# Patient Record
Sex: MALE | Race: ASIAN | NOT HISPANIC OR LATINO | URBAN - METROPOLITAN AREA
[De-identification: names, ages, dates, MRNs, and addresses within clinical notes are randomized per-mention and may not be internally consistent; named-entity substitution may affect disease eponyms.]

---

## 2022-03-25 ENCOUNTER — INPATIENT (INPATIENT)
Facility: HOSPITAL | Age: 67
LOS: 2 days | Discharge: HOME | DRG: 247 | End: 2022-03-28
Attending: INTERNAL MEDICINE | Admitting: HOSPITALIST
Payer: COMMERCIAL

## 2022-03-25 VITALS
TEMPERATURE: 98 F | HEIGHT: 65 IN | WEIGHT: 154.98 LBS | RESPIRATION RATE: 17 BRPM | OXYGEN SATURATION: 98 % | SYSTOLIC BLOOD PRESSURE: 160 MMHG | DIASTOLIC BLOOD PRESSURE: 67 MMHG | HEART RATE: 66 BPM

## 2022-03-25 LAB
APTT BLD: 106.6 SEC — HIGH (ref 27.5–35.5)
BASOPHILS # BLD AUTO: 0.02 K/UL — SIGNIFICANT CHANGE UP (ref 0–0.2)
BASOPHILS NFR BLD AUTO: 0.2 % — SIGNIFICANT CHANGE UP (ref 0–2)
EOSINOPHIL # BLD AUTO: 0.13 K/UL — SIGNIFICANT CHANGE UP (ref 0–0.5)
EOSINOPHIL NFR BLD AUTO: 1.3 % — SIGNIFICANT CHANGE UP (ref 0–6)
HCT VFR BLD CALC: 40.6 % — SIGNIFICANT CHANGE UP (ref 39–50)
HGB BLD-MCNC: 13.7 G/DL — SIGNIFICANT CHANGE UP (ref 13–17)
IMM GRANULOCYTES NFR BLD AUTO: 0.5 % — SIGNIFICANT CHANGE UP (ref 0–1.5)
INR BLD: 1 RATIO — SIGNIFICANT CHANGE UP (ref 0.88–1.16)
LYMPHOCYTES # BLD AUTO: 3.87 K/UL — HIGH (ref 1–3.3)
LYMPHOCYTES # BLD AUTO: 39.9 % — SIGNIFICANT CHANGE UP (ref 13–44)
MCHC RBC-ENTMCNC: 28.8 PG — SIGNIFICANT CHANGE UP (ref 27–34)
MCHC RBC-ENTMCNC: 33.7 GM/DL — SIGNIFICANT CHANGE UP (ref 32–36)
MCV RBC AUTO: 85.3 FL — SIGNIFICANT CHANGE UP (ref 80–100)
MONOCYTES # BLD AUTO: 0.81 K/UL — SIGNIFICANT CHANGE UP (ref 0–0.9)
MONOCYTES NFR BLD AUTO: 8.3 % — SIGNIFICANT CHANGE UP (ref 2–14)
NEUTROPHILS # BLD AUTO: 4.83 K/UL — SIGNIFICANT CHANGE UP (ref 1.8–7.4)
NEUTROPHILS NFR BLD AUTO: 49.8 % — SIGNIFICANT CHANGE UP (ref 43–77)
NRBC # BLD: 0 /100 WBCS — SIGNIFICANT CHANGE UP (ref 0–0)
PLATELET # BLD AUTO: 245 K/UL — SIGNIFICANT CHANGE UP (ref 150–400)
PROTHROM AB SERPL-ACNC: 11.6 SEC — SIGNIFICANT CHANGE UP (ref 10.5–13.4)
RBC # BLD: 4.76 M/UL — SIGNIFICANT CHANGE UP (ref 4.2–5.8)
RBC # FLD: 13.3 % — SIGNIFICANT CHANGE UP (ref 10.3–14.5)
WBC # BLD: 9.71 K/UL — SIGNIFICANT CHANGE UP (ref 3.8–10.5)
WBC # FLD AUTO: 9.71 K/UL — SIGNIFICANT CHANGE UP (ref 3.8–10.5)

## 2022-03-25 PROCEDURE — 93010 ELECTROCARDIOGRAM REPORT: CPT

## 2022-03-25 PROCEDURE — 71045 X-RAY EXAM CHEST 1 VIEW: CPT | Mod: 26

## 2022-03-25 PROCEDURE — 99285 EMERGENCY DEPT VISIT HI MDM: CPT

## 2022-03-25 RX ORDER — HEPARIN SODIUM 5000 [USP'U]/ML
INJECTION INTRAVENOUS; SUBCUTANEOUS
Qty: 25000 | Refills: 0 | Status: DISCONTINUED | OUTPATIENT
Start: 2022-03-25 | End: 2022-03-27

## 2022-03-25 RX ORDER — HEPARIN SODIUM 5000 [USP'U]/ML
INJECTION INTRAVENOUS; SUBCUTANEOUS
Qty: 25000 | Refills: 0 | Status: DISCONTINUED | OUTPATIENT
Start: 2022-03-25 | End: 2022-03-25

## 2022-03-25 RX ORDER — HEPARIN SODIUM 5000 [USP'U]/ML
4100 INJECTION INTRAVENOUS; SUBCUTANEOUS EVERY 6 HOURS
Refills: 0 | Status: DISCONTINUED | OUTPATIENT
Start: 2022-03-25 | End: 2022-03-25

## 2022-03-25 RX ORDER — HEPARIN SODIUM 5000 [USP'U]/ML
4100 INJECTION INTRAVENOUS; SUBCUTANEOUS ONCE
Refills: 0 | Status: DISCONTINUED | OUTPATIENT
Start: 2022-03-25 | End: 2022-03-27

## 2022-03-25 RX ADMIN — HEPARIN SODIUM 900 UNIT(S)/HR: 5000 INJECTION INTRAVENOUS; SUBCUTANEOUS at 23:31

## 2022-03-25 NOTE — ED PROVIDER NOTE - CLINICAL SUMMARY MEDICAL DECISION MAKING FREE TEXT BOX
65y/o M w/ h/o HTN, HLD, prediabetes BIBEMS for NSTEMI, 2wks exertional mid sternal chest pain, given 324mg ASA, Brilinta, heparin bolus. without active pain. EKG shows STD in inferior leads. Will re-check labs, cardiac enzymes, CXR and admit.

## 2022-03-25 NOTE — ED PROVIDER NOTE - OBJECTIVE STATEMENT
65y/o M w/ h/o HTN, HLD, prediabetes (HbA1c 6.5%) transfer from [facility] BIBEMS fro NSTEMI. Pt states he has had 2wks mid sternal chest pain, worse with exertion. Today was unable to walk 1 block so went to ED and had elevated troponin. Was given 324mg ASA, Brilinta, heparin bolus. Now in no active chest pain. Denies fevers, chills, nausea, vomiting, palpitations, cough, sob, abdominal pain, back pain, dysuria, numbness, tingling, recent surgeries, travel history, calf pain, FHx heart disease.

## 2022-03-25 NOTE — ED PROVIDER NOTE - ATTENDING CONTRIBUTION TO CARE
attending Camelia: 66yM h/o HTN, HLD, prediabetes (HbA1c 6.5%) transferred from OSH for NSTEMI. Pt with 2 weeks mid sternal chest pain, worse with exertion. Found to have elevated troponin, given 324mg ASA, Brilinta, heparin bolus. No active chest pain on arrival. Will obtain repeat ekg, place on tele, labs including trop, maintain on heparin gtt, cardiology eval in ED, admit

## 2022-03-25 NOTE — ED ADULT TRIAGE NOTE - TEMPERATURE IN CELSIUS (DEGREES C)
Erythromycin Pregnancy And Lactation Text: This medication is Pregnancy Category B and is considered safe during pregnancy. It is also excreted in breast milk. Topical Retinoid Pregnancy And Lactation Text: This medication is Pregnancy Category C. It is unknown if this medication is excreted in breast milk. Minocycline Pregnancy And Lactation Text: This medication is Pregnancy Category D and not consider safe during pregnancy. It is also excreted in breast milk. Dapsone Pregnancy And Lactation Text: This medication is Pregnancy Category C and is not considered safe during pregnancy or breast feeding. Isotretinoin Counseling: Patient should get monthly blood tests, not donate blood, not drive at night if vision affected, not share medication, and not undergo elective surgery for 6 months after tx completed. Side effects reviewed, pt to contact office should one occur. Azithromycin Counseling:  I discussed with the patient the risks of azithromycin including but not limited to GI upset, allergic reaction, drug rash, diarrhea, and yeast infections. Topical Clindamycin Pregnancy And Lactation Text: This medication is Pregnancy Category B and is considered safe during pregnancy. It is unknown if it is excreted in breast milk. Spironolactone Counseling: Patient advised regarding risks of diarrhea, abdominal pain, hyperkalemia, birth defects (for female patients), liver toxicity and renal toxicity. The patient may need blood work to monitor liver and kidney function and potassium levels while on therapy. The patient verbalized understanding of the proper use and possible adverse effects of spironolactone.  All of the patient's questions and concerns were addressed. Azithromycin Pregnancy And Lactation Text: This medication is considered safe during pregnancy and is also secreted in breast milk. Minocycline Counseling: Patient advised regarding possible photosensitivity and discoloration of the teeth, skin, lips, tongue and gums.  Patient instructed to avoid sunlight, if possible.  When exposed to sunlight, patients should wear protective clothing, sunglasses, and sunscreen.  The patient was instructed to call the office immediately if the following severe adverse effects occur:  hearing changes, easy bruising/bleeding, severe headache, or vision changes.  The patient verbalized understanding of the proper use and possible adverse effects of minocycline.  All of the patient's questions and concerns were addressed. Topical Sulfur Applications Counseling: Topical Sulfur Counseling: Patient counseled that this medication may cause skin irritation or allergic reactions.  In the event of skin irritation, the patient was advised to reduce the amount of the drug applied or use it less frequently.   The patient verbalized understanding of the proper use and possible adverse effects of topical sulfur application.  All of the patient's questions and concerns were addressed. Doxycycline Pregnancy And Lactation Text: This medication is Pregnancy Category D and not consider safe during pregnancy. It is also excreted in breast milk but is considered safe for shorter treatment courses. Tazorac Counseling:  Patient advised that medication is irritating and drying.  Patient may need to apply sparingly and wash off after an hour before eventually leaving it on overnight.  The patient verbalized understanding of the proper use and possible adverse effects of tazorac.  All of the patient's questions and concerns were addressed. Include Pregnancy/Lactation Warning?: No Bactrim Counseling:  I discussed with the patient the risks of sulfa antibiotics including but not limited to GI upset, allergic reaction, drug rash, diarrhea, dizziness, photosensitivity, and yeast infections.  Rarely, more serious reactions can occur including but not limited to aplastic anemia, agranulocytosis, methemoglobinemia, blood dyscrasias, liver or kidney failure, lung infiltrates or desquamative/blistering drug rashes. 36.9 Topical Sulfur Applications Pregnancy And Lactation Text: This medication is Pregnancy Category C and has an unknown safety profile during pregnancy. It is unknown if this topical medication is excreted in breast milk. Erythromycin Counseling:  I discussed with the patient the risks of erythromycin including but not limited to GI upset, allergic reaction, drug rash, diarrhea, increase in liver enzymes, and yeast infections. Topical Retinoid counseling:  Patient advised to apply a pea-sized amount only at bedtime and wait 30 minutes after washing their face before applying.  If too drying, patient may add a non-comedogenic moisturizer. The patient verbalized understanding of the proper use and possible adverse effects of retinoids.  All of the patient's questions and concerns were addressed. Topical Clindamycin Counseling: Patient counseled that this medication may cause skin irritation or allergic reactions.  In the event of skin irritation, the patient was advised to reduce the amount of the drug applied or use it less frequently.   The patient verbalized understanding of the proper use and possible adverse effects of clindamycin.  All of the patient's questions and concerns were addressed. Birth Control Pills Pregnancy And Lactation Text: This medication should be avoided if pregnant and for the first 30 days post-partum. Birth Control Pills Counseling: Birth Control Pill Counseling: I discussed with the patient the potential side effects of OCPs including but not limited to increased risk of stroke, heart attack, thrombophlebitis, deep venous thrombosis, hepatic adenomas, breast changes, GI upset, headaches, and depression.  The patient verbalized understanding of the proper use and possible adverse effects of OCPs. All of the patient's questions and concerns were addressed. Benzoyl Peroxide Pregnancy And Lactation Text: This medication is Pregnancy Category C. It is unknown if benzoyl peroxide is excreted in breast milk. Dapsone Counseling: I discussed with the patient the risks of dapsone including but not limited to hemolytic anemia, agranulocytosis, rashes, methemoglobinemia, kidney failure, peripheral neuropathy, headaches, GI upset, and liver toxicity.  Patients who start dapsone require monitoring including baseline LFTs and weekly CBCs for the first month, then every month thereafter.  The patient verbalized understanding of the proper use and possible adverse effects of dapsone.  All of the patient's questions and concerns were addressed. Tazorac Pregnancy And Lactation Text: This medication is not safe during pregnancy. It is unknown if this medication is excreted in breast milk. Detail Level: Zone High Dose Vitamin A Counseling: Side effects reviewed, pt to contact office should one occur. Spironolactone Pregnancy And Lactation Text: This medication can cause feminization of the male fetus and should be avoided during pregnancy. The active metabolite is also found in breast milk. Benzoyl Peroxide Counseling: Patient counseled that medicine may cause skin irritation and bleach clothing.  In the event of skin irritation, the patient was advised to reduce the amount of the drug applied or use it less frequently.   The patient verbalized understanding of the proper use and possible adverse effects of benzoyl peroxide.  All of the patient's questions and concerns were addressed. High Dose Vitamin A Pregnancy And Lactation Text: High dose vitamin A therapy is contraindicated during pregnancy and breast feeding. Tetracycline Counseling: Patient counseled regarding possible photosensitivity and increased risk for sunburn.  Patient instructed to avoid sunlight, if possible.  When exposed to sunlight, patients should wear protective clothing, sunglasses, and sunscreen.  The patient was instructed to call the office immediately if the following severe adverse effects occur:  hearing changes, easy bruising/bleeding, severe headache, or vision changes.  The patient verbalized understanding of the proper use and possible adverse effects of tetracycline.  All of the patient's questions and concerns were addressed. Patient understands to avoid pregnancy while on therapy due to potential birth defects. Isotretinoin Pregnancy And Lactation Text: This medication is Pregnancy Category X and is considered extremely dangerous during pregnancy. It is unknown if it is excreted in breast milk. Bactrim Pregnancy And Lactation Text: This medication is Pregnancy Category D and is known to cause fetal risk.  It is also excreted in breast milk. Doxycycline Counseling:  Patient counseled regarding possible photosensitivity and increased risk for sunburn.  Patient instructed to avoid sunlight, if possible.  When exposed to sunlight, patients should wear protective clothing, sunglasses, and sunscreen.  The patient was instructed to call the office immediately if the following severe adverse effects occur:  hearing changes, easy bruising/bleeding, severe headache, or vision changes.  The patient verbalized understanding of the proper use and possible adverse effects of doxycycline.  All of the patient's questions and concerns were addressed.

## 2022-03-25 NOTE — ED ADULT NURSE REASSESSMENT NOTE - NS ED NURSE REASSESS COMMENT FT1
Second IV placed for additional access. Heparin infusion initiated as per orders based off ACS Heparin nomogram. Second RN present to confirm correct medication administration. Patient currently safe and comfortable. Will continue with plan of care as ordered.

## 2022-03-26 DIAGNOSIS — I21.4 NON-ST ELEVATION (NSTEMI) MYOCARDIAL INFARCTION: ICD-10-CM

## 2022-03-26 LAB
ALBUMIN SERPL ELPH-MCNC: 4.8 G/DL — SIGNIFICANT CHANGE UP (ref 3.3–5)
ALP SERPL-CCNC: 86 U/L — SIGNIFICANT CHANGE UP (ref 40–120)
ALT FLD-CCNC: 17 U/L — SIGNIFICANT CHANGE UP (ref 10–45)
ANION GAP SERPL CALC-SCNC: 17 MMOL/L — SIGNIFICANT CHANGE UP (ref 5–17)
APTT BLD: 129 SEC — CRITICAL HIGH (ref 27.5–35.5)
APTT BLD: 41.4 SEC — HIGH (ref 27.5–35.5)
APTT BLD: 45.7 SEC — HIGH (ref 27.5–35.5)
AST SERPL-CCNC: 38 U/L — SIGNIFICANT CHANGE UP (ref 10–40)
BILIRUB SERPL-MCNC: 0.4 MG/DL — SIGNIFICANT CHANGE UP (ref 0.2–1.2)
BUN SERPL-MCNC: 11 MG/DL — SIGNIFICANT CHANGE UP (ref 7–23)
CALCIUM SERPL-MCNC: 10 MG/DL — SIGNIFICANT CHANGE UP (ref 8.4–10.5)
CHLORIDE SERPL-SCNC: 104 MMOL/L — SIGNIFICANT CHANGE UP (ref 96–108)
CK MB BLD-MCNC: 2.2 % — SIGNIFICANT CHANGE UP (ref 0–3.5)
CK MB CFR SERPL CALC: 2.9 NG/ML — SIGNIFICANT CHANGE UP (ref 0–6.7)
CK MB CFR SERPL CALC: 3.5 NG/ML — SIGNIFICANT CHANGE UP (ref 0–6.7)
CK SERPL-CCNC: 132 U/L — SIGNIFICANT CHANGE UP (ref 30–200)
CO2 SERPL-SCNC: 20 MMOL/L — LOW (ref 22–31)
CREAT SERPL-MCNC: 0.97 MG/DL — SIGNIFICANT CHANGE UP (ref 0.5–1.3)
EGFR: 87 ML/MIN/1.73M2 — SIGNIFICANT CHANGE UP
GLUCOSE SERPL-MCNC: 109 MG/DL — HIGH (ref 70–99)
HCT VFR BLD CALC: 39.2 % — SIGNIFICANT CHANGE UP (ref 39–50)
HGB BLD-MCNC: 13.1 G/DL — SIGNIFICANT CHANGE UP (ref 13–17)
MCHC RBC-ENTMCNC: 28.6 PG — SIGNIFICANT CHANGE UP (ref 27–34)
MCHC RBC-ENTMCNC: 33.4 GM/DL — SIGNIFICANT CHANGE UP (ref 32–36)
MCV RBC AUTO: 85.6 FL — SIGNIFICANT CHANGE UP (ref 80–100)
NRBC # BLD: 0 /100 WBCS — SIGNIFICANT CHANGE UP (ref 0–0)
PLATELET # BLD AUTO: 213 K/UL — SIGNIFICANT CHANGE UP (ref 150–400)
POTASSIUM SERPL-MCNC: 4.6 MMOL/L — SIGNIFICANT CHANGE UP (ref 3.5–5.3)
POTASSIUM SERPL-SCNC: 4.6 MMOL/L — SIGNIFICANT CHANGE UP (ref 3.5–5.3)
PROT SERPL-MCNC: 7.9 G/DL — SIGNIFICANT CHANGE UP (ref 6–8.3)
RBC # BLD: 4.58 M/UL — SIGNIFICANT CHANGE UP (ref 4.2–5.8)
RBC # FLD: 13.5 % — SIGNIFICANT CHANGE UP (ref 10.3–14.5)
SARS-COV-2 RNA SPEC QL NAA+PROBE: SIGNIFICANT CHANGE UP
SODIUM SERPL-SCNC: 141 MMOL/L — SIGNIFICANT CHANGE UP (ref 135–145)
TROPONIN T, HIGH SENSITIVITY RESULT: 31 NG/L — SIGNIFICANT CHANGE UP (ref 0–51)
TROPONIN T, HIGH SENSITIVITY RESULT: 34 NG/L — SIGNIFICANT CHANGE UP (ref 0–51)
TROPONIN T, HIGH SENSITIVITY RESULT: 45 NG/L — SIGNIFICANT CHANGE UP (ref 0–51)
TROPONIN T, HIGH SENSITIVITY RESULT: 50 NG/L — SIGNIFICANT CHANGE UP (ref 0–51)
WBC # BLD: 9.55 K/UL — SIGNIFICANT CHANGE UP (ref 3.8–10.5)
WBC # FLD AUTO: 9.55 K/UL — SIGNIFICANT CHANGE UP (ref 3.8–10.5)

## 2022-03-26 PROCEDURE — 99223 1ST HOSP IP/OBS HIGH 75: CPT | Mod: GC

## 2022-03-26 RX ORDER — LISINOPRIL 2.5 MG/1
1 TABLET ORAL
Qty: 0 | Refills: 0 | DISCHARGE

## 2022-03-26 RX ORDER — METOPROLOL TARTRATE 50 MG
25 TABLET ORAL
Refills: 0 | Status: DISCONTINUED | OUTPATIENT
Start: 2022-03-26 | End: 2022-03-27

## 2022-03-26 RX ORDER — ATORVASTATIN CALCIUM 80 MG/1
1 TABLET, FILM COATED ORAL
Qty: 0 | Refills: 0 | DISCHARGE

## 2022-03-26 RX ORDER — ASPIRIN/CALCIUM CARB/MAGNESIUM 324 MG
81 TABLET ORAL DAILY
Refills: 0 | Status: DISCONTINUED | OUTPATIENT
Start: 2022-03-26 | End: 2022-03-28

## 2022-03-26 RX ORDER — ATORVASTATIN CALCIUM 80 MG/1
40 TABLET, FILM COATED ORAL AT BEDTIME
Refills: 0 | Status: DISCONTINUED | OUTPATIENT
Start: 2022-03-26 | End: 2022-03-28

## 2022-03-26 RX ORDER — LISINOPRIL 2.5 MG/1
10 TABLET ORAL DAILY
Refills: 0 | Status: DISCONTINUED | OUTPATIENT
Start: 2022-03-26 | End: 2022-03-27

## 2022-03-26 RX ORDER — ASPIRIN/CALCIUM CARB/MAGNESIUM 324 MG
1 TABLET ORAL
Qty: 0 | Refills: 0 | DISCHARGE

## 2022-03-26 RX ADMIN — HEPARIN SODIUM 850 UNIT(S)/HR: 5000 INJECTION INTRAVENOUS; SUBCUTANEOUS at 14:40

## 2022-03-26 RX ADMIN — LISINOPRIL 10 MILLIGRAM(S): 2.5 TABLET ORAL at 14:47

## 2022-03-26 RX ADMIN — HEPARIN SODIUM 700 UNIT(S)/HR: 5000 INJECTION INTRAVENOUS; SUBCUTANEOUS at 08:02

## 2022-03-26 RX ADMIN — Medication 25 MILLIGRAM(S): at 18:13

## 2022-03-26 RX ADMIN — HEPARIN SODIUM 1000 UNIT(S)/HR: 5000 INJECTION INTRAVENOUS; SUBCUTANEOUS at 22:51

## 2022-03-26 RX ADMIN — HEPARIN SODIUM 0 UNIT(S)/HR: 5000 INJECTION INTRAVENOUS; SUBCUTANEOUS at 07:02

## 2022-03-26 RX ADMIN — ATORVASTATIN CALCIUM 40 MILLIGRAM(S): 80 TABLET, FILM COATED ORAL at 21:42

## 2022-03-26 RX ADMIN — Medication 81 MILLIGRAM(S): at 14:50

## 2022-03-26 RX ADMIN — HEPARIN SODIUM 700 UNIT(S)/HR: 5000 INJECTION INTRAVENOUS; SUBCUTANEOUS at 09:15

## 2022-03-26 NOTE — ED ADULT NURSE REASSESSMENT NOTE - NS ED NURSE REASSESS COMMENT FT1
repeat INR & CBC sent to lab as per MD order. Awaiting results for Heparin titration, system in downtime. ANM aware.

## 2022-03-26 NOTE — H&P ADULT - NSHPLABSRESULTS_GEN_ALL_CORE
LABS:                        13.1   9.55  )-----------( 213      ( 26 Mar 2022 06:01 )             39.2     03-25    141  |  104  |  11  ----------------------------<  109<H>  4.6   |  20<L>  |  0.97    Ca    10.0      25 Mar 2022 23:24    TPro  7.9  /  Alb  4.8  /  TBili  0.4  /  DBili  x   /  AST  38  /  ALT  17  /  AlkPhos  86  03-25    PT/INR - ( 25 Mar 2022 23:24 )   PT: 11.6 sec;   INR: 1.00 ratio         PTT - ( 26 Mar 2022 05:20 )  PTT:129.0 sec        RADIOLOGY & ADDITIONAL TESTS:

## 2022-03-26 NOTE — CONSULT NOTE ADULT - ATTENDING COMMENTS
66yoM PMH HTN, HLD, DM presenting as a transfer from [facility] for NSTEMI with typical chest pain and troponin production. For Cath.  -Patients symptoms are typical for unstable angina. CE here are not suggestive of MI. That said given symptoms and risk factors patient should be managed as ACS. Continue medications for ACS. Plan for cardiac cath. Currently pain free. If pain returns will do cath emergently. Discussed with patient's wife and Dr. Sifuentes.

## 2022-03-26 NOTE — H&P ADULT - ASSESSMENT
65 y/o male PMH HTN, HLD, prediabetes (HbA1c 6.5%) transfer from [facility] [place] fro NSTEMI. Pt states he has had 2 weeks of mid sternal chest pain, worse with exertion. Today was unable to walk 1 block so went to ED and had elevated troponin. Was given 324mg ASA, Brilinta, heparin bolus. Now in no active chest pain. Denies fevers, chills, nausea, vomiting, sob, abdominal pain, back pain, dysuria. FHx heart disease.    Plan: Admit to tele, follow trop. EKG is NSR, trop was elevated mildly at prior hospital.     Start IV Heparin, ASA, Lipitor, Lisinopril, add Lopressor low dose. Cath on Monday.   65 y/o male PMH HTN, HLD, prediabetes (HbA1c 6.5%) transfer from [facility] [place] fro NSTEMI. Pt states he has had 2 weeks of mid sternal chest pain, worse with exertion. Today was unable to walk 1 block so went to ED and had elevated troponin. Was given 324mg ASA, Brilinta, heparin bolus. Now in no active chest pain. Denies fevers, chills, nausea, vomiting, sob, abdominal pain, back pain, dysuria. FHx heart disease.      Plan: Admit to tele for possible NSTEMI. Follow trop. EKG is NSR, trop was elevated mildly at prior hospital, now normal x 2.      Start IV Heparin, ASA, Lipitor, Lisinopril, add Lopressor low dose. Cath on Monday.  Discussed case at bedside with Cardiology.     DM: Stable, glucose 106 on arrival, A1C 6.7, good control, SSC.

## 2022-03-26 NOTE — ED ADULT NURSE REASSESSMENT NOTE - NS ED NURSE REASSESS COMMENT FT1
Report received from SANDRA Zamora in CC step down. Pt remains in the ED. Resting comfortably in bed. A&Ox3. Breathing spontaneously and unlabored. NAD. VSS. On cardiac monitor, NSR. Heparin restarted after hour of being paused. Co-signed by SANDRA Herrera. Pt safety maintained. Will continue to monitor. Awaiting bed.

## 2022-03-26 NOTE — ED ADULT NURSE NOTE - OBJECTIVE STATEMENT
65 y/o M A&Ox3 PMH HLD, HTN denies PSH presents to the ED via EMS from OSH c/o chest pain. Pt reports worsening chest pain on exertion x2 weeks. Upon arrival to ED pt is well appearing. Breathing is even and unlabored. Skin is warm, dry & in tact. EKG completed at bedside, placed on CM- NSR. EMS reports pt received heparin bolus & PO anticoagulation at OSH. Denies SOB, HA, diaphoresis, N/V/D, numbness, tingling, fevers, chills. 2nd IV access obtained. Actual weight obtained and documented. Call bell within reach, comfort & safety provided.

## 2022-03-26 NOTE — CONSULT NOTE ADULT - SUBJECTIVE AND OBJECTIVE BOX
Patient seen and evaluated at bedside    HPI:  66yoM PMH HTN, HLD, DM presenting as a transfer from [facility] for NSTEMI.    Patient presented after having two weeks of stable angina symptoms, exacerbated by waking or emotion, improved by rest. When he was coming back from his [place], he started to have substernal/left sided chest pain as usual, but then it was continuous without stopping. HE presented to [facility] for this.    EKG showing submm SIDNEY in AvR with subMM depressions in the inferior leads and lateral precordials. Troponin I was .02 (Normal <0.01). R 77 .77. Loaded with brillinta and aspirin and transferred to [facility]. On my exam, his chest pain had subsided and he felt well, on room air.     REVIEW OF SYSTEMS Negative unless otherwise mentioned    PMHx:   HTN (hypertension)  HLD (hyperlipidemia)    PSHx:   No significant past surgical history    Soc: No toxic habits    FAMILY HISTORY:  Allergies:  Allergy Status Unknown    Home Meds:    Current Medications:   heparin   Injectable 4100 Unit(s) IV Push Once PRN  heparin  Infusion.  Unit(s)/Hr IV Continuous <Continuous>    Physical Exam:  T(F): 97.9 (03-26), Max: 98.5 (03-25)  HR: 72 (03-26) (66 - 89)  BP: 127/72 (03-26) (109/61 - 182/94)  RR: 18 (03-26)  SpO2: 100% (03-26)    Well appearing male, NAD  JVP normal  RRR  CTAB  Soft NTND  WWP, no pitting edema    Cardiovascular Diagnostic Testing:    Labs: reviewed    Assessment & Plan:  66yoM PMH HTN, HLD, DM presenting as a transfer from [facility] for NSTEMI with typical chest pain and troponin production. For Cath.    --NPO for cath, COVID swab, coags  --Please ensure patient loaded with   --Please ensure patient laoded with 180 of Brillinta, then 90 BID to start 24 hours later (Can do 600 of Plavix)  --Please start heparin gtt, please bolus to achieve ptt per ACS protcol  --Please trend troponin until downtrending, please repeat EKG with every troponin  --Please order a transthoracic echo stat  --Please monitor on telemetry  --K>4, Mg>2  --If patient develops hemodynamic instability, please call    Recommendations not final unless countersigned by attending    Moise Hopper PGY4  Cardiology Fellow Patient seen and evaluated at bedside    HPI:  66yoM PMH HTN, HLD, DM presenting as a transfer from [facility] for NSTEMI.    Patient presented after having two weeks of stable angina symptoms, exacerbated by waking or emotion, improved by rest. When he was coming back from his [place], he started to have substernal/left sided chest pain as usual, but then it was continuous without stopping. HE presented to [facility] for [place].    EKG showing submm SIDNEY in AvR with subMM depressions in the inferior leads and lateral precordials. Troponin I was .02 (Normal <0.01). R 77 .77. Loaded with brillinta and aspirin and transferred to [facility]. On my exam, his chest pain had subsided and he felt well, on room air.     REVIEW OF SYSTEMS Negative unless otherwise mentioned    PMHx:   HTN (hypertension)  HLD (hyperlipidemia)    PSHx:   No significant past surgical history    Soc: No toxic habits    FAMILY HISTORY:  Allergies:  Allergy Status Unknown    Home Meds:    Current Medications:   heparin   Injectable 4100 Unit(s) IV Push Once PRN  heparin  Infusion.  Unit(s)/Hr IV Continuous <Continuous>    Physical Exam:  T(F): 97.9 (03-26), Max: 98.5 (03-25)  HR: 72 (03-26) (66 - 89)  BP: 127/72 (03-26) (109/61 - 182/94)  RR: 18 (03-26)  SpO2: 100% (03-26)    Well appearing male, NAD  JVP normal  RRR  CTAB  Soft NTND  WWP, no pitting edema    Cardiovascular Diagnostic Testing:    Labs: reviewed    Assessment & Plan:  66yoM PMH HTN, HLD, DM presenting as a transfer from [facility] for NSTEMI with typical chest pain and troponin production. For Cath.    --NPO for cath likely Sunday unless develops chest pain again, COVID swab, coags  --Please ensure patient loaded with   --Please ensure patient laoded with 180 of Brillinta, then 90 BID to start 24 hours later (Can do 600 of Plavix)  --Please start heparin gtt, please bolus to achieve ptt per ACS protcol  --Please trend troponin until downtrending, please repeat EKG with every troponin  --Please order a transthoracic echo stat  --Please monitor on telemetry  --K>4, Mg>2  --If patient develops hemodynamic instability, please call    Recommendations not final unless countersigned by attending    Moise Hopper PGY4  Cardiology Fellow

## 2022-03-26 NOTE — ED ADULT NURSE REASSESSMENT NOTE - NS ED NURSE REASSESS COMMENT FT1
Heparin paused as per Heparin nomogram. 2nd RN at bedside for confirmation. Pt awake and alert, admitted as per MD orders. Awaiting bed assignment.

## 2022-03-26 NOTE — ED ADULT NURSE REASSESSMENT NOTE - NS ED NURSE REASSESS COMMENT FT1
Report received from Cristiana FLROES. Pt AAOx4, NAD, resp nonlabored, skin warm/dry, resting comfortably in. pt denies cp at this time. Heparin drip paused with Cristiana FLORES according to heparin nomogram. Pt awaiting bed placement. Safety maintained.

## 2022-03-26 NOTE — ED ADULT NURSE REASSESSMENT NOTE - NS ED NURSE REASSESS COMMENT FT1
Patient resting comfortably, repeat trop sent. No c/o pain or discomfort. Awaiting dispo. Comfort and safety measures maintained.

## 2022-03-26 NOTE — H&P ADULT - HISTORY OF PRESENT ILLNESS
65 y/o male PMH HTN, HLD, prediabetes (HbA1c 6.5%) transfer from [facility] BIBEMS fro NSTEMI. Pt states he has had 2 weeks of mid sternal chest pain, worse with exertion. Today was unable to walk 1 block so went to ED and had elevated troponin. Was given 324mg ASA, Brilinta, heparin bolus. Now in no active chest pain. Denies fevers, chills, nausea, vomiting, sob, abdominal pain, back pain, dysuria. FHx heart disease.

## 2022-03-26 NOTE — PATIENT PROFILE ADULT - FALL HARM RISK - HARM RISK INTERVENTIONS
Communicate Risk of Fall with Harm to all staff/Orthostatic vital signs/Reinforce activity limits and safety measures with patient and family/Tailored Fall Risk Interventions/Visual Cue: Yellow wristband and red socks/Bed in lowest position, wheels locked, appropriate side rails in place/Call bell, personal items and telephone in reach/Instruct patient to call for assistance before getting out of bed or chair/Non-slip footwear when patient is out of bed/[place] to call system/Physically safe environment - no spills, clutter or unnecessary equipment/Purposeful Proactive Rounding/Room/bathroom lighting operational, light cord in reach

## 2022-03-26 NOTE — H&P ADULT - NSHPPHYSICALEXAM_GEN_ALL_CORE
PHYSICAL EXAMINATION:  Vital Signs Last 24 Hrs  T(C): 36.5 (26 Mar 2022 08:40), Max: 36.9 (25 Mar 2022 22:38)  T(F): 97.7 (26 Mar 2022 08:40), Max: 98.5 (25 Mar 2022 22:38)  HR: 64 (26 Mar 2022 08:40) (64 - 89)  BP: 138/78 (26 Mar 2022 08:40) (109/61 - 182/94)  BP(mean): --  RR: 15 (26 Mar 2022 08:40) (13 - 19)  SpO2: 100% (26 Mar 2022 08:40) (98% - 100%)  CAPILLARY BLOOD GLUCOSE          GENERAL: NAD, well-groomed, well-developed  HEAD:  atraumatic, normocephalic  EYES: sclera anicteric  ENMT: mucous membranes moist  NECK: supple, No JVD  CHEST/LUNG: clear to auscultation bilaterally; no rales, rhonchi, or wheezing b/l  HEART: normal S1, S2  ABDOMEN: BS+, soft, ND, NT   EXTREMITIES:  pulses palpable; no clubbing, cyanosis, or edema b/l LEs  NEURO: awake, alert, interactive; moves all extremities  SKIN: no rashes or lesions PHYSICAL EXAMINATION:  Vital Signs Last 24 Hrs  T(C): 36.5 (26 Mar 2022 08:40), Max: 36.9 (25 Mar 2022 22:38)  T(F): 97.7 (26 Mar 2022 08:40), Max: 98.5 (25 Mar 2022 22:38)  HR: 64 (26 Mar 2022 08:40) (64 - 89)  BP: 138/78 (26 Mar 2022 08:40) (109/61 - 182/94)  BP(mean): --  RR: 15 (26 Mar 2022 08:40) (13 - 19)  SpO2: 100% (26 Mar 2022 08:40) (98% - 100%)  CAPILLARY BLOOD GLUCOSE          GENERAL: NAD, seen in ER, comfortable, Cardio at bedside.   HEAD:  atraumatic, normocephalic  EYES: sclera anicteric  ENMT: mucous membranes moist  NECK: supple, No JVD  CHEST/LUNG: clear to auscultation bilaterally; no rales, rhonchi, or wheezing b/l  HEART: normal S1, S2  ABDOMEN: BS+, soft, ND, NT   EXTREMITIES:  pulses palpable; no clubbing, cyanosis, or edema b/l LEs  NEURO: awake, alert, interactive; moves all extremities  SKIN: no rashes or lesions

## 2022-03-27 LAB
A1C WITH ESTIMATED AVERAGE GLUCOSE RESULT: 6.7 % — HIGH (ref 4–5.6)
APTT BLD: 59.1 SEC — HIGH (ref 27.5–35.5)
CHOLEST SERPL-MCNC: 160 MG/DL — SIGNIFICANT CHANGE UP
ESTIMATED AVERAGE GLUCOSE: 146 MG/DL — HIGH (ref 68–114)
HCT VFR BLD CALC: 40.8 % — SIGNIFICANT CHANGE UP (ref 39–50)
HCV AB S/CO SERPL IA: 0.19 S/CO — SIGNIFICANT CHANGE UP (ref 0–0.99)
HCV AB SERPL-IMP: SIGNIFICANT CHANGE UP
HDLC SERPL-MCNC: 38 MG/DL — LOW
HGB BLD-MCNC: 13.7 G/DL — SIGNIFICANT CHANGE UP (ref 13–17)
LIPID PNL WITH DIRECT LDL SERPL: 62 MG/DL — SIGNIFICANT CHANGE UP
MCHC RBC-ENTMCNC: 28.5 PG — SIGNIFICANT CHANGE UP (ref 27–34)
MCHC RBC-ENTMCNC: 33.6 GM/DL — SIGNIFICANT CHANGE UP (ref 32–36)
MCV RBC AUTO: 84.8 FL — SIGNIFICANT CHANGE UP (ref 80–100)
NON HDL CHOLESTEROL: 122 MG/DL — SIGNIFICANT CHANGE UP
NRBC # BLD: 0 /100 WBCS — SIGNIFICANT CHANGE UP (ref 0–0)
PLATELET # BLD AUTO: 215 K/UL — SIGNIFICANT CHANGE UP (ref 150–400)
RBC # BLD: 4.81 M/UL — SIGNIFICANT CHANGE UP (ref 4.2–5.8)
RBC # FLD: 13.3 % — SIGNIFICANT CHANGE UP (ref 10.3–14.5)
TRIGL SERPL-MCNC: 301 MG/DL — HIGH
TROPONIN T, HIGH SENSITIVITY RESULT: 28 NG/L — SIGNIFICANT CHANGE UP (ref 0–51)
WBC # BLD: 8.47 K/UL — SIGNIFICANT CHANGE UP (ref 3.8–10.5)
WBC # FLD AUTO: 8.47 K/UL — SIGNIFICANT CHANGE UP (ref 3.8–10.5)

## 2022-03-27 PROCEDURE — 99152 MOD SED SAME PHYS/QHP 5/>YRS: CPT

## 2022-03-27 PROCEDURE — 93458 L HRT ARTERY/VENTRICLE ANGIO: CPT | Mod: 26,59

## 2022-03-27 PROCEDURE — 93010 ELECTROCARDIOGRAM REPORT: CPT

## 2022-03-27 PROCEDURE — 99232 SBSQ HOSP IP/OBS MODERATE 35: CPT | Mod: GC

## 2022-03-27 PROCEDURE — 92928 PRQ TCAT PLMT NTRAC ST 1 LES: CPT | Mod: LC

## 2022-03-27 RX ORDER — TICAGRELOR 90 MG/1
90 TABLET ORAL EVERY 12 HOURS
Refills: 0 | Status: DISCONTINUED | OUTPATIENT
Start: 2022-03-27 | End: 2022-03-28

## 2022-03-27 RX ORDER — LISINOPRIL 2.5 MG/1
10 TABLET ORAL DAILY
Refills: 0 | Status: DISCONTINUED | OUTPATIENT
Start: 2022-03-27 | End: 2022-03-28

## 2022-03-27 RX ADMIN — TICAGRELOR 90 MILLIGRAM(S): 90 TABLET ORAL at 21:22

## 2022-03-27 RX ADMIN — LISINOPRIL 10 MILLIGRAM(S): 2.5 TABLET ORAL at 05:23

## 2022-03-27 RX ADMIN — Medication 25 MILLIGRAM(S): at 05:23

## 2022-03-27 RX ADMIN — Medication 81 MILLIGRAM(S): at 07:44

## 2022-03-27 RX ADMIN — HEPARIN SODIUM 1000 UNIT(S)/HR: 5000 INJECTION INTRAVENOUS; SUBCUTANEOUS at 06:42

## 2022-03-27 RX ADMIN — ATORVASTATIN CALCIUM 40 MILLIGRAM(S): 80 TABLET, FILM COATED ORAL at 21:22

## 2022-03-27 NOTE — PROGRESS NOTE ADULT - ASSESSMENT
66yoM PMH HTN, HLD, DM presenting as a transfer from [facility] for NSTEMI. Has been chest pain free since arrival and troponins here have been negative however given EKG abnormalities, prior CP, mildly elevated trop at OSH, and risk factors, reasonable to pursue LHC.     - tentative plan for LHC today   - continue aspirin 81mg   - please ensure patient received ticagrelor 180mg, to discuss continuation post LHC   - can continue heparin for now   - awaiting TTE   - telemetry  - agree with metoprolol and lisinopril as written   - cardiology will continue to follow    66yoM PMH HTN, HLD, DM presenting as a transfer from [facility] for NSTEMI. Has been chest pain free since arrival and troponins here have been negative however given EKG abnormalities, prior CP, mildly elevated trop at OSH, and risk factors, reasonable to pursue LHC.     - tentative plan for LHC today   - continue aspirin 81mg   - high intensity statin   - please ensure patient received ticagrelor 180mg, to discuss continuation post LHC   - can continue heparin for now   - awaiting TTE   - telemetry  - agree with metoprolol and lisinopril as written   - cardiology will continue to follow

## 2022-03-27 NOTE — PROGRESS NOTE ADULT - SUBJECTIVE AND OBJECTIVE BOX
INTERVAL HPI/OVERNIGHT EVENTS:  Pt seen and examined at bedside.     Allergies/Intolerance: No Known Allergies      MEDICATIONS  (STANDING):  aspirin enteric coated 81 milliGRAM(s) Oral daily  atorvastatin 40 milliGRAM(s) Oral at bedtime  lisinopril 10 milliGRAM(s) Oral daily  metoprolol tartrate 25 milliGRAM(s) Oral two times a day  ticagrelor 90 milliGRAM(s) Oral every 12 hours    MEDICATIONS  (PRN):        ROS: all systems reviewed and wnl      PHYSICAL EXAMINATION:  Vital Signs Last 24 Hrs  T(C): 36.6 (27 Mar 2022 09:20), Max: 36.8 (26 Mar 2022 18:09)  T(F): 97.8 (27 Mar 2022 09:20), Max: 98.3 (26 Mar 2022 18:09)  HR: 64 (27 Mar 2022 10:35) (55 - 86)  BP: 132/66 (27 Mar 2022 10:35) (115/67 - 147/80)  BP(mean): --  RR: 17 (27 Mar 2022 10:35) (16 - 18)  SpO2: 96% (27 Mar 2022 10:35) (94% - 100%)  CAPILLARY BLOOD GLUCOSE            GENERAL: stable, in bed, comfortable, no CP, pleurisy or SOB at rest.   NECK: supple, No JVD  CHEST/LUNG: clear to auscultation bilaterally; no rales, rhonchi, or wheezing b/l  HEART: normal S1, S2  ABDOMEN: BS+, soft, ND, NT   EXTREMITIES:  pulses palpable; no clubbing, cyanosis, or edema b/l LEs  SKIN: no rashes or lesions      LABS:                        13.7   8.47  )-----------( 215      ( 27 Mar 2022 06:10 )             40.8     03-25    141  |  104  |  11  ----------------------------<  109<H>  4.6   |  20<L>  |  0.97    Ca    10.0      25 Mar 2022 23:24    TPro  7.9  /  Alb  4.8  /  TBili  0.4  /  DBili  x   /  AST  38  /  ALT  17  /  AlkPhos  86  03-25    PT/INR - ( 25 Mar 2022 23:24 )   PT: 11.6 sec;   INR: 1.00 ratio         PTT - ( 27 Mar 2022 05:53 )  PTT:59.1 sec

## 2022-03-27 NOTE — CHART NOTE - NSCHARTNOTEFT_GEN_A_CORE
Medicine NP Note     CANDIDA YATES  MRN-[id number]  Allergies    No Known Allergies    Intolerances     Called to evaluate patient R Wrist Swelling post cardiac cath. Pt arrived to the floor stable as per RN. NO admits to increased pain and swelling to r wrist. RN unable to ge in touch with cath lab.     Vital Signs Last 24 Hrs  T(C): 36.9 (22 @ 12:05), Max: 36.9 (22 @ 12:05)  T(F): 98.5 (22 @ 12:05), Max: 98.5 (22 @ 12:05)  HR: 73 (22 @ 12:50) (55 - 96)  BP: 103/50 (22 @ 12:50) (103/50 - 158/77)  BP(mean): --  RR: 17 (22 @ 12:05) (15 - 18)  SpO2: 100% (22 @ 12:05) (94% - 100%)  Daily     Daily Weight in k.8 (27 Mar 2022 09:18)  I&O's Summary    CAPILLARY BLOOD GLUCOSE                          13.7   8.47  )-----------( 215      ( 27 Mar 2022 06:10 )             40.8         141  |  104  |  11  ----------------------------<  109<H>  4.6   |  20<L>  |  0.97    Ca    10.0      25 Mar 2022 23:24    TPro  7.9  /  Alb  4.8  /  TBili  0.4  /  DBili  x   /  AST  38  /  ALT  17  /  AlkPhos  86  03-25    PT/INR - ( 25 Mar 2022 23:24 )   PT: 11.6 sec;   INR: 1.00 ratio         PTT - ( 27 Mar 2022 05:53 )  PTT:59.1 sec  CARDIAC MARKERS ( 26 Mar 2022 14:57 )  x     / x     / 132 U/L / x     / 2.9 ng/mL  CARDIAC MARKERS ( 25 Mar 2022 23:24 )  x     / x     / x     / x     / 3.5 ng/mL      Radiology:    PHYSICAL EXAM:  GENERAL: NAD, well-developed  CHEST/LUNG: Clear to auscultation bilaterally; No wheeze  HEART: Regular rate and rhythm;   ABDOMEN: Soft, Nontender, Nondistended; Bowel sounds present  EXTREMITIES:   R wrist with soft swelling about R wrist dressing. Strong pulse  PSYCH: AAOx3  NEUROLOGY: non-focal      Assessment/Plan: HPI:  65 y/o male PMH HTN, HLD, prediabetes (HbA1c 6.5%) transfer from [facility] fro NSTEMI status post cath -LHC: proximal LAD 40%. proximal Cx 95% s/p RICCARDO x 1. RCA mild disease. RRA access. ASA and Brilinta. now with increased swelliing to site    1. Swelling post cath- swelling appears soft. Improving since pressure held per RN. Call placed to Dr. Heath, fellow on call who will come to evaluate. Continue to monitor for now. await recommendations

## 2022-03-27 NOTE — PROGRESS NOTE ADULT - SUBJECTIVE AND OBJECTIVE BOX
Patient seen and examined at bedside.    Overnight Events: No acute events, remains chest pain free    Review Of Systems:   CONSTITUTIONAL: No weakness, fevers or chills  EYES/ENT: No visual changes;  No dysphagia  NECK: No pain or stiffness  RESPIRATORY: No cough, wheezing, hemoptysis  CARDIOVASCULAR: No chest pain or palpitations; No lower extremity edema  GASTROINTESTINAL: No abdominal or epigastric pain. No nausea, vomiting, or hematemesis; No diarrhea or constipation. No melena or hematochezia.  BACK: No back pain  GENITOURINARY: No dysuria, frequency or hematuria  NEUROLOGICAL: No numbness or weakness  SKIN: No itching, burning, rashes, or lesions   All other review of systems is negative unless indicated above.          Current Meds:  aspirin enteric coated 81 milliGRAM(s) Oral daily  atorvastatin 40 milliGRAM(s) Oral at bedtime  heparin   Injectable 4100 Unit(s) IV Push Once PRN  heparin  Infusion.  Unit(s)/Hr IV Continuous <Continuous>  lisinopril 10 milliGRAM(s) Oral daily  metoprolol tartrate 25 milliGRAM(s) Oral two times a day      Vitals:  T(F): 98.1 (03-27), Max: 98.3 (03-26)  HR: 79 (03-27) (60 - 86)  BP: 115/69 (03-27) (115/67 - 149/77)  RR: 16 (03-27)  SpO2: 94% (03-27)  I&O's Summary      Physical Exam:  Appearance: No acute distress; well appearing  Eyes: PERRL, EOMI, pink conjunctiva  HEENT: Normal oral mucosa  Cardiovascular: RRR, S1, S2, no murmurs, rubs, or gallops; no edema; no JVD  Respiratory: Clear to auscultation bilaterally  Gastrointestinal: soft, non-tender, non-distended with normal bowel sounds  Musculoskeletal: No clubbing; no joint deformity   Neurologic: Non-focal  Lymphatic: No lymphadenopathy  Psychiatry: AAOx3, mood & affect appropriate  Skin: No rashes, ecchymoses, or cyanosis                          13.7   8.47  )-----------( 215      ( 27 Mar 2022 06:10 )             40.8     03-25    141  |  104  |  11  ----------------------------<  109<H>  4.6   |  20<L>  |  0.97    Ca    10.0      25 Mar 2022 23:24    TPro  7.9  /  Alb  4.8  /  TBili  0.4  /  DBili  x   /  AST  38  /  ALT  17  /  AlkPhos  86  03-25    PT/INR - ( 25 Mar 2022 23:24 )   PT: 11.6 sec;   INR: 1.00 ratio         PTT - ( 27 Mar 2022 05:53 )  PTT:59.1 sec  CARDIAC MARKERS ( 27 Mar 2022 05:57 )  28 ng/L / x     / x     / x     / x     / x      CARDIAC MARKERS ( 26 Mar 2022 22:05 )  31 ng/L / x     / x     / x     / x     / x      CARDIAC MARKERS ( 26 Mar 2022 14:57 )  34 ng/L / x     / x     / 132 U/L / x     / 2.9 ng/mL  CARDIAC MARKERS ( 26 Mar 2022 02:43 )  45 ng/L / x     / x     / x     / x     / x      CARDIAC MARKERS ( 25 Mar 2022 23:24 )  50 ng/L / x     / x     / x     / x     / 3.5 ng/mL    Serum Pro-Brain Natriuretic Peptide: 94 pg/mL (03-25 @ 23:24)

## 2022-03-28 VITALS
SYSTOLIC BLOOD PRESSURE: 138 MMHG | HEART RATE: 89 BPM | OXYGEN SATURATION: 98 % | DIASTOLIC BLOOD PRESSURE: 77 MMHG | TEMPERATURE: 98 F | RESPIRATION RATE: 18 BRPM

## 2022-03-28 LAB
ANION GAP SERPL CALC-SCNC: 16 MMOL/L — SIGNIFICANT CHANGE UP (ref 5–17)
BUN SERPL-MCNC: 16 MG/DL — SIGNIFICANT CHANGE UP (ref 7–23)
CALCIUM SERPL-MCNC: 9.7 MG/DL — SIGNIFICANT CHANGE UP (ref 8.4–10.5)
CHLORIDE SERPL-SCNC: 100 MMOL/L — SIGNIFICANT CHANGE UP (ref 96–108)
CO2 SERPL-SCNC: 20 MMOL/L — LOW (ref 22–31)
CREAT SERPL-MCNC: 1.09 MG/DL — SIGNIFICANT CHANGE UP (ref 0.5–1.3)
EGFR: 76 ML/MIN/1.73M2 — SIGNIFICANT CHANGE UP
GLUCOSE SERPL-MCNC: 131 MG/DL — HIGH (ref 70–99)
HCT VFR BLD CALC: 42.3 % — SIGNIFICANT CHANGE UP (ref 39–50)
HGB BLD-MCNC: 14.1 G/DL — SIGNIFICANT CHANGE UP (ref 13–17)
MAGNESIUM SERPL-MCNC: 2 MG/DL — SIGNIFICANT CHANGE UP (ref 1.6–2.6)
MCHC RBC-ENTMCNC: 29 PG — SIGNIFICANT CHANGE UP (ref 27–34)
MCHC RBC-ENTMCNC: 33.3 GM/DL — SIGNIFICANT CHANGE UP (ref 32–36)
MCV RBC AUTO: 86.9 FL — SIGNIFICANT CHANGE UP (ref 80–100)
NRBC # BLD: 0 /100 WBCS — SIGNIFICANT CHANGE UP (ref 0–0)
PLATELET # BLD AUTO: 213 K/UL — SIGNIFICANT CHANGE UP (ref 150–400)
POTASSIUM SERPL-MCNC: 4.2 MMOL/L — SIGNIFICANT CHANGE UP (ref 3.5–5.3)
POTASSIUM SERPL-SCNC: 4.2 MMOL/L — SIGNIFICANT CHANGE UP (ref 3.5–5.3)
RBC # BLD: 4.87 M/UL — SIGNIFICANT CHANGE UP (ref 4.2–5.8)
RBC # FLD: 13.5 % — SIGNIFICANT CHANGE UP (ref 10.3–14.5)
SODIUM SERPL-SCNC: 136 MMOL/L — SIGNIFICANT CHANGE UP (ref 135–145)
WBC # BLD: 11.06 K/UL — HIGH (ref 3.8–10.5)
WBC # FLD AUTO: 11.06 K/UL — HIGH (ref 3.8–10.5)

## 2022-03-28 PROCEDURE — 99153 MOD SED SAME PHYS/QHP EA: CPT

## 2022-03-28 PROCEDURE — 86803 HEPATITIS C AB TEST: CPT

## 2022-03-28 PROCEDURE — 93458 L HRT ARTERY/VENTRICLE ANGIO: CPT | Mod: 59

## 2022-03-28 PROCEDURE — C9600: CPT | Mod: LC

## 2022-03-28 PROCEDURE — C1894: CPT

## 2022-03-28 PROCEDURE — 83880 ASSAY OF NATRIURETIC PEPTIDE: CPT

## 2022-03-28 PROCEDURE — 71045 X-RAY EXAM CHEST 1 VIEW: CPT

## 2022-03-28 PROCEDURE — 80048 BASIC METABOLIC PNL TOTAL CA: CPT

## 2022-03-28 PROCEDURE — 93005 ELECTROCARDIOGRAM TRACING: CPT

## 2022-03-28 PROCEDURE — C1874: CPT

## 2022-03-28 PROCEDURE — 99152 MOD SED SAME PHYS/QHP 5/>YRS: CPT

## 2022-03-28 PROCEDURE — 82553 CREATINE MB FRACTION: CPT

## 2022-03-28 PROCEDURE — 85025 COMPLETE CBC W/AUTO DIFF WBC: CPT

## 2022-03-28 PROCEDURE — 85027 COMPLETE CBC AUTOMATED: CPT

## 2022-03-28 PROCEDURE — 80061 LIPID PANEL: CPT

## 2022-03-28 PROCEDURE — 93306 TTE W/DOPPLER COMPLETE: CPT | Mod: 26

## 2022-03-28 PROCEDURE — 84484 ASSAY OF TROPONIN QUANT: CPT

## 2022-03-28 PROCEDURE — U0005: CPT

## 2022-03-28 PROCEDURE — 83036 HEMOGLOBIN GLYCOSYLATED A1C: CPT

## 2022-03-28 PROCEDURE — 85610 PROTHROMBIN TIME: CPT

## 2022-03-28 PROCEDURE — 85730 THROMBOPLASTIN TIME PARTIAL: CPT

## 2022-03-28 PROCEDURE — C1887: CPT

## 2022-03-28 PROCEDURE — 82550 ASSAY OF CK (CPK): CPT

## 2022-03-28 PROCEDURE — 93306 TTE W/DOPPLER COMPLETE: CPT

## 2022-03-28 PROCEDURE — C1769: CPT

## 2022-03-28 PROCEDURE — 80053 COMPREHEN METABOLIC PANEL: CPT

## 2022-03-28 PROCEDURE — C1725: CPT

## 2022-03-28 PROCEDURE — 99285 EMERGENCY DEPT VISIT HI MDM: CPT

## 2022-03-28 PROCEDURE — 36415 COLL VENOUS BLD VENIPUNCTURE: CPT

## 2022-03-28 PROCEDURE — U0003: CPT

## 2022-03-28 PROCEDURE — 83735 ASSAY OF MAGNESIUM: CPT

## 2022-03-28 RX ORDER — METOPROLOL TARTRATE 50 MG
1 TABLET ORAL
Qty: 30 | Refills: 0
Start: 2022-03-28 | End: 2022-04-26

## 2022-03-28 RX ORDER — TICAGRELOR 90 MG/1
1 TABLET ORAL
Qty: 60 | Refills: 0
Start: 2022-03-28 | End: 2022-04-26

## 2022-03-28 RX ADMIN — Medication 81 MILLIGRAM(S): at 11:14

## 2022-03-28 RX ADMIN — LISINOPRIL 10 MILLIGRAM(S): 2.5 TABLET ORAL at 04:55

## 2022-03-28 RX ADMIN — TICAGRELOR 90 MILLIGRAM(S): 90 TABLET ORAL at 08:24

## 2022-03-28 NOTE — PROGRESS NOTE ADULT - ASSESSMENT
67 y/o male PMH HTN, HLD, prediabetes (HbA1c 6.5%) transfer from [facility] [place] fro NSTEMI. Pt states he has had 2 weeks of mid sternal chest pain, worse with exertion. Today was unable to walk 1 block so went to ED and had elevated troponin. Was given 324mg ASA, Brilinta, heparin bolus. Now in no active chest pain. Denies fevers, chills, nausea, vomiting, sob, abdominal pain, back pain, dysuria. FHx heart disease.      Plan: Admit to tele for possible NSTEMI. Follow trop. EKG is NSR, trop was elevated mildly at prior hospital, now normal x 2.      Start IV Heparin, ASA, Lipitor, Lisinopril, add Lopressor low dose. Cath on Monday.  Discussed case at bedside with Cardiology.     DM: Stable, glucose 106 on arrival, A1C 6.7, good control, SSC.

## 2022-03-28 NOTE — DISCHARGE NOTE PROVIDER - CARE PROVIDER_API CALL
Deshawn Mejía)  Internal Medicine  [address] [place], [place] [id number]  Phone: [phone]  Fax: [phone]  Follow Up Time: 1 week

## 2022-03-28 NOTE — DISCHARGE NOTE PROVIDER - NSDCFUADDAPPT_GEN_ALL_CORE_FT
Make sure to find a cardiologist and schedule an appointment within 1-2 weeks. Your insurance is not covered in the cardiology clinic in this hospital. Take your discharge paperwork with you to your follow up appointments. You can request your primary care doctor to refer you to a cardiologist. make sure to follow up with your primary care doctor within one week.

## 2022-03-28 NOTE — DISCHARGE NOTE PROVIDER - HOSPITAL COURSE
67 y/o male PMH HTN, HLD, prediabetes (HbA1c 6.5%) transfer from [facility] fro NSTEMI. Pt states he has had 2 weeks of mid sternal chest pain, worse with exertion. Today was unable to walk 1 block so went to ED and had elevated troponin. Was given 324mg ASA, Brilinta, heparin bolus. Now in no active chest pain. Denies fevers, chills, nausea, vomiting, sob, abdominal pain, back pain, dysuria. FHx heart disease.    Patient was admitted to [facility] for NSTEMI. EKG is NSR, trop was elevated mildly at prior hospital, now normal x 2.      Start IV Heparin, ASA, Lipitor, Lisinopril, add Lopressor low dose. S/p left heart cath on 3/27. proximal LAD 40%. proximal Cx 95% s/p RICCARDO x 1. RCA mild disease    DM: Stable, glucose 106 on arrival, A1C 6.7, good control, SSC    Pt started on aspirin and brillinta. pt cleared for discharge. 67 y/o male PMH HTN, HLD, prediabetes (HbA1c 6.5%) transfer from [facility] [place] fro NSTEMI. Pt states he has had 2 weeks of mid sternal chest pain, worse with exertion. Today was unable to walk 1 block so went to ED and had elevated troponin. Was given 324mg ASA, Brilinta, heparin bolus. Now in ER no active chest pain. Denies fevers, chills, nausea, vomiting, sob, abdominal pain, back pain, dysuria. FHx heart disease.    Patient was admitted to [facility] for NSTEMI. EKG is NSR, trop was elevated mildly at prior hospital, now normal x 2.      Start IV Heparin, ASA, Lipitor, Lisinopril, add Lopressor low dose. S/p left heart cath on 3/27. proximal LAD 40%. proximal Cx 95% s/p RICCARDO x 1. RCA mild disease    DM: Stable, glucose 106 on arrival, A1C 6.7, good control, SSC    Pt started on aspirin and brillinta. pt cleared for discharge to home.

## 2022-03-28 NOTE — DISCHARGE NOTE NURSING/CASE MANAGEMENT/SOCIAL WORK - NSDCPEFALRISK_GEN_ALL_CORE
For information on Fall & Injury Prevention, visit: [email or web address] OR  [email or web address] OR  https://www.cdc.gov/steadi/patient.html

## 2022-03-28 NOTE — PROGRESS NOTE ADULT - SUBJECTIVE AND OBJECTIVE BOX
INTERVAL HPI/OVERNIGHT EVENTS:  Pt seen and examined at bedside.     Allergies/Intolerance: No Known Allergies      MEDICATIONS  (STANDING):  aspirin enteric coated 81 milliGRAM(s) Oral daily  atorvastatin 40 milliGRAM(s) Oral at bedtime  lisinopril 10 milliGRAM(s) Oral daily  ticagrelor 90 milliGRAM(s) Oral every 12 hours    MEDICATIONS  (PRN):        ROS: all systems reviewed and wnl      PHYSICAL EXAMINATION:  Vital Signs Last 24 Hrs  T(C): 36.7 (28 Mar 2022 04:52), Max: 36.9 (27 Mar 2022 12:05)  T(F): 98 (28 Mar 2022 04:52), Max: 98.5 (27 Mar 2022 12:05)  HR: 87 (28 Mar 2022 04:52) (55 - 96)  BP: 135/75 (28 Mar 2022 04:52) (103/50 - 158/77)  BP(mean): --  RR: 16 (28 Mar 2022 04:52) (15 - 18)  SpO2: 96% (28 Mar 2022 04:52) (94% - 100%)  CAPILLARY BLOOD GLUCOSE          03-27 @ 07:01  -  03-28 @ 07:00  --------------------------------------------------------  IN: 540 mL / OUT: 200 mL / NET: 340 mL        GENERAL: stable in bed, no CP or SOB, no right arm pain, right radial pulse is normal.    NECK: supple, No JVD  CHEST/LUNG: clear to auscultation bilaterally; no rales, rhonchi, or wheezing b/l  HEART: normal S1, S2  ABDOMEN: BS+, soft, ND, NT   EXTREMITIES:  pulses palpable; no clubbing, cyanosis, or edema b/l LEs  SKIN: no rashes or lesions      LABS:                        14.1   11.06 )-----------( 213      ( 28 Mar 2022 06:36 )             42.3     03-28    136  |  100  |  16  ----------------------------<  131<H>  4.2   |  20<L>  |  1.09    Ca    9.7      28 Mar 2022 06:35  Mg     2.0     03-28      PTT - ( 27 Mar 2022 05:53 )  PTT:59.1 sec

## 2022-03-28 NOTE — DISCHARGE NOTE NURSING/CASE MANAGEMENT/SOCIAL WORK - PATIENT PORTAL LINK FT
You can access the FollowMyHealth Patient Portal offered by [facility] by registering at the following website: [email or web address]. By joining [employer]’s FollowMyHealth portal, you will also be able to view your health information using other applications (apps) compatible with our system.

## 2022-03-28 NOTE — PROGRESS NOTE ADULT - SUBJECTIVE AND OBJECTIVE BOX
Patient seen and examined at bedside.    Overnight Events: No acute events overnight. Denies chest pain or SOB      REVIEW OF SYSTEMS:  Constitutional:     [ x] negative [ ] fevers [ ] chills [ ] weight loss [ ] weight gain  HEENT:                  [ x] negative [ ] dry eyes [ ] eye irritation [ ] postnasal drip [ ] nasal congestion  CV:                         [x ] negative  [ ] chest pain [ ] orthopnea [ ] palpitations [ ] murmur  Resp:                     [ x] negative [ ] cough [ ] shortness of breath [ ] dyspnea [ ] wheezing [ ] sputum [ ]hemoptysis  GI:                          [ x] negative [ ] nausea [ ] vomiting [ ] diarrhea [ ] constipation [ ] abd pain [ ] dysphagia   :                        [ x] negative [ ] dysuria [ ] nocturia [ ] hematuria [ ] increased urinary frequency  Musculoskeletal: [ x] negative [ ] back pain [ ] myalgias [ ] arthralgias [ ] fracture  Skin:                       [ x] negative [ ] rash [ ] itch  Neurological:        [ x] negative [ ] headache [ ] dizziness [ ] syncope [ ] weakness [ ] numbness  Psychiatric:           [ x] negative [ ] anxiety [ ] depression  Endocrine:            [ x] negative [ ] diabetes [ ] thyroid problem  Heme/Lymph:      [ x] negative [ ] anemia [ ] bleeding problem  Allergic/Immune: [x ] negative [ ] itchy eyes [ ] nasal discharge [ ] hives [ ] angioedema    [x ] All other systems negative  [ ] Unable to assess ROS due to    Current Meds:  aspirin enteric coated 81 milliGRAM(s) Oral daily  atorvastatin 40 milliGRAM(s) Oral at bedtime  lisinopril 10 milliGRAM(s) Oral daily  ticagrelor 90 milliGRAM(s) Oral every 12 hours        Vitals:  T(F): 98 (), Max: 98.5 ()  HR: 87 () (55 - 96)  BP: 135/75 () (103/50 - 158/77)  RR: 16 ()  SpO2: 96% ()  I&O's Summary    27 Mar 2022 07:  -  28 Mar 2022 06:29  --------------------------------------------------------  IN: 540 mL / OUT: 0 mL / NET: 540 mL        Physical Exam:  Appearance: No acute distress; well appearing  Eyes: PERRL, EOMI, pink conjunctiva  HENT: Normal oral mucosa  Cardiovascular: RRR, S1, S2, no murmurs, rubs, or gallops; no edema; no JVD  Respiratory: Clear to auscultation bilaterally  Gastrointestinal: soft, non-tender, non-distended with normal bowel sounds  Musculoskeletal: No clubbing; no joint deformity   Neurologic: Non-focal  Lymphatic: No lymphadenopathy  Psychiatry: AAOx3, mood & affect appropriate  Skin: No rashes, ecchymoses, or cyanosis                          13.7   8.47  )-----------( 215      ( 27 Mar 2022 06:10 )             40.8           PTT - ( 27 Mar 2022 05:53 )  PTT:59.1 sec  CARDIAC MARKERS ( 26 Mar 2022 14:57 )  x     / x     / 132 U/L / x     / 2.9 ng/mL      Serum Pro-Brain Natriuretic Peptide: 94 pg/mL ( @ 23:24)    Total Cholesterol: 160  LDL: --  HDL: 38  T           Patient seen and examined at bedside.    Overnight Events: No acute events overnight. Denies chest pain or SOB      REVIEW OF SYSTEMS:  Constitutional:     [ x] negative [ ] fevers [ ] chills [ ] weight loss [ ] weight gain  HEENT:                  [ x] negative [ ] dry eyes [ ] eye irritation [ ] postnasal drip [ ] nasal congestion  CV:                         [x ] negative  [ ] chest pain [ ] orthopnea [ ] palpitations [ ] murmur  Resp:                     [ x] negative [ ] cough [ ] shortness of breath [ ] dyspnea [ ] wheezing [ ] sputum [ ]hemoptysis  GI:                          [ x] negative [ ] nausea [ ] vomiting [ ] diarrhea [ ] constipation [ ] abd pain [ ] dysphagia   :                        [ x] negative [ ] dysuria [ ] nocturia [ ] hematuria [ ] increased urinary frequency  Musculoskeletal: [ x] negative [ ] back pain [ ] myalgias [ ] arthralgias [ ] fracture  Skin:                       [ x] negative [ ] rash [ ] itch  Neurological:        [ x] negative [ ] headache [ ] dizziness [ ] syncope [ ] weakness [ ] numbness  Psychiatric:           [ x] negative [ ] anxiety [ ] depression  Endocrine:            [ x] negative [ ] diabetes [ ] thyroid problem  Heme/Lymph:      [ x] negative [ ] anemia [ ] bleeding problem  Allergic/Immune: [x ] negative [ ] itchy eyes [ ] nasal discharge [ ] hives [ ] angioedema    [x ] All other systems negative  [ ] Unable to assess ROS due to    Current Meds:  aspirin enteric coated 81 milliGRAM(s) Oral daily  atorvastatin 40 milliGRAM(s) Oral at bedtime  lisinopril 10 milliGRAM(s) Oral daily  ticagrelor 90 milliGRAM(s) Oral every 12 hours        Vitals:  T(F): 98 (), Max: 98.5 ()  HR: 87 () (55 - 96)  BP: 135/75 () (103/50 - 158/77)  RR: 16 ()  SpO2: 96% ()  I&O's Summary    27 Mar 2022 07:  -  28 Mar 2022 06:29  --------------------------------------------------------  IN: 540 mL / OUT: 0 mL / NET: 540 mL        Physical Exam:  Appearance: No acute distress; well appearing  Eyes: PERRL, EOMI, pink conjunctiva  HENT: Normal oral mucosa  Cardiovascular: RRR, S1, S2, no murmurs, rubs, or gallops; no edema; no JVD  Respiratory: Clear to auscultation bilaterally  Gastrointestinal: soft, non-tender, non-distended with normal bowel sounds  Musculoskeletal: No clubbing; no joint deformity   Neurologic: Non-focal  Psychiatry: AAOx3, mood & affect appropriate  Skin: No rashes, ecchymoses, or cyanosis                          13.7   8.47  )-----------( 215      ( 27 Mar 2022 06:10 )             40.8           PTT - ( 27 Mar 2022 05:53 )  PTT:59.1 sec  CARDIAC MARKERS ( 26 Mar 2022 14:57 )  x     / x     / 132 U/L / x     / 2.9 ng/mL      Serum Pro-Brain Natriuretic Peptide: 94 pg/mL ( @ 23:24)    Total Cholesterol: 160  LDL: --  HDL: 38  T

## 2022-03-28 NOTE — DISCHARGE NOTE PROVIDER - NSFOLLOWUPCLINICS_GEN_ALL_ED_FT
[facility]  Cardiology  [address] [place] [id number]  Phone: [phone]  Fax:

## 2022-03-28 NOTE — PROGRESS NOTE ADULT - SUBJECTIVE AND OBJECTIVE BOX
INTERVENTIONAL CARDIOLOGY FELLOW PROGRESS NOTE    Subjective:    No acute events overnight.   ROS negative.     Current Medications:   aspirin enteric coated 81 milliGRAM(s) Oral daily  atorvastatin 40 milliGRAM(s) Oral at bedtime  lisinopril 10 milliGRAM(s) Oral daily  ticagrelor 90 milliGRAM(s) Oral every 12 hours      REVIEW OF SYSTEMS:  CONSTITUTIONAL: No weakness, fevers or chills  EYES/ENT: No visual changes;  No dysphagia  NECK: No pain or stiffness  RESPIRATORY: No cough, wheezing, hemoptysis; No shortness of breath  CARDIOVASCULAR: No chest pain or palpitations; No lower extremity edema  GASTROINTESTINAL: No abdominal or epigastric pain. No nausea, vomiting, or hematemesis; No diarrhea or constipation. No melena or hematochezia.  BACK: No back pain  GENITOURINARY: No dysuria, frequency or hematuria  NEUROLOGICAL: No numbness or weakness  SKIN: No itching, burning, rashes, or lesions   All other review of systems is negative unless indicated above.    Physical Exam:  T(F): 98.1 (-), Max: 98.1 (-)  HR: 89 () (70 - 89)  BP: 138/77 () (111/65 - 138/77)  BP(mean): --  ABP: --  ABP(mean): --  RR: 18 (-)  SpO2: 98% ()  GENERAL: No acute distress, well-developed  HEAD:  Atraumatic, Normocephalic  ENT: EOMI, PERRLA, conjunctiva and sclera clear, Neck supple, No JVD, moist mucosa  CHEST/LUNG: Clear to auscultation bilaterally; No wheeze, equal breath sounds bilaterally   BACK: No spinal tenderness  HEART: Regular rate and rhythm; No murmurs, rubs, or gallops  ABDOMEN: Soft, Nontender, Nondistended; Bowel sounds present  EXTREMITIES:  No clubbing, cyanosis, or edema  PSYCH: Nl behavior, nl affect  NEUROLOGY: AAOx3, non-focal, cranial nerves intact  SKIN: Normal color, No rashes or lesions. RRA 2+ pulse, no hematoma.    Cardiovascular Diagnostic Testing: personally reviewed    CXR: Personally reviewed    Labs: Personally reviewed                        14.1   11.06 )-----------( 213      ( 28 Mar 2022 06:36 )             42.3     03-28    136  |  100  |  16  ----------------------------<  131<H>  4.2   |  20<L>  |  1.09    Ca    9.7      28 Mar 2022 06:35  Mg     2.0           PTT - ( 27 Mar 2022 05:53 )  PTT:59.1 sec    CARDIAC MARKERS ( 27 Mar 2022 05:57 )  28 ng/L / x     / x     / x     / x     / x      CARDIAC MARKERS ( 26 Mar 2022 22:05 )  31 ng/L / x     / x     / x     / x     / x      CARDIAC MARKERS ( 26 Mar 2022 14:57 )  34 ng/L / x     / x     / 132 U/L / x     / 2.9 ng/mL  CARDIAC MARKERS ( 26 Mar 2022 02:43 )  45 ng/L / x     / x     / x     / x     / x      CARDIAC MARKERS ( 25 Mar 2022 23:24 )  50 ng/L / x     / x     / x     / x     / 3.5 ng/mL        Serum Pro-Brain Natriuretic Peptide: 94 pg/mL ( @ 23:24)    Total Cholesterol: 160  LDL: --  HDL: 38  T

## 2022-03-28 NOTE — PROGRESS NOTE ADULT - ASSESSMENT
65 yo man s/p RICCARDO to LCx, RRA access.    #CAD  - DAPT w/ ASA 81mg daily and brilinta 90mg q12h (please ensure insurance coverage prior to dc)  - High intensity lipitor  - Please make cardiology appt prior to dc    Bycarie Puente MD  PGY5 Cardiology

## 2022-03-28 NOTE — PROGRESS NOTE ADULT - ASSESSMENT
66yoM PMH HTN, HLD, DM presenting as a transfer from [facility] for NSTEMI. Has been chest pain free since arrival and troponins here have been negative however given EKG abnormalities, prior CP, mildly elevated trop at OSH, and risk factors, now s/p LHC    1) Chest pain- s/p LHC with LCx intervention on 3/27  - continue aspirin 81mg, brilinta 90 BID  - high intensity statin   - awaiting TTE   - telemetry  - continue metoprolol 25 and lisinopril as written   -please ensure patient has cardiology follow up upon dc

## 2022-03-28 NOTE — DISCHARGE NOTE PROVIDER - NSDCMRMEDTOKEN_GEN_ALL_CORE_FT
Aspirin Enteric Coated 81 mg oral delayed release tablet: 1 tab(s) orally once a day  Lipitor 40 mg oral tablet: 1 tab(s) orally once a day  lisinopril 10 mg oral tablet: 1 tab(s) orally once a day  metoprolol succinate 25 mg oral tablet, extended release: 1 tab(s) orally once a day   ticagrelor 90 mg oral tablet: 1 tab(s) orally every 12 hours

## 2022-03-28 NOTE — PHARMACOTHERAPY INTERVENTION NOTE - COMMENTS
Pharmacy intern Audelia Guillen counseled patient on discharge medication doses, indications, and possible side effects. Provided and reviewed medication cards. Patient exhibited understanding of discharge medication regimen.     New medication Brilinta required a letter of medical necessity for approval through the patient's insurance. Medication  [employer]bel helped provide this information to get the medication approved for a copay of $20 and helped apply a  coupon to bring the copay down to $5.    Viridiana Dill, PharmD, [facility]  Clinical Pharmacy Specialist  [phone] or Teams

## 2022-03-28 NOTE — DISCHARGE NOTE PROVIDER - NSDCCPCAREPLAN_GEN_ALL_CORE_FT
PRINCIPAL DISCHARGE DIAGNOSIS  Diagnosis: NSTEMI (non-ST elevation myocardial infarction)  Assessment and Plan of Treatment: status post left heart catheterization with 1 stent placement. Continue your home dose of aspirin and lisinopril. You were also started on Brillinta 90 mg, take one tablet two times a day as instructed. You were also sent a prescription for metoprolol 25 mg, one tablet once a day.   Please make sure to follow up with your cardiologist within 1-2 weeks.

## 2023-07-17 PROBLEM — Z00.00 ENCOUNTER FOR PREVENTIVE HEALTH EXAMINATION: Status: ACTIVE | Noted: 2023-07-17

## 2024-06-13 NOTE — ED PROVIDER NOTE - ABNORMAL RHYTHM
Call placed to pt to introduce self/ role of  within the palliative medicine office.  This  also shared [facility] information as well.  This  is available as needed for additional supportive care.    JOSE M Daugherty  [facility] Palliative outpatient   [phone]     sinus arrhythmia